# Patient Record
Sex: FEMALE | Race: BLACK OR AFRICAN AMERICAN | NOT HISPANIC OR LATINO
[De-identification: names, ages, dates, MRNs, and addresses within clinical notes are randomized per-mention and may not be internally consistent; named-entity substitution may affect disease eponyms.]

---

## 2018-07-03 PROBLEM — Z00.00 ENCOUNTER FOR PREVENTIVE HEALTH EXAMINATION: Status: ACTIVE | Noted: 2018-07-03

## 2018-07-20 ENCOUNTER — APPOINTMENT (OUTPATIENT)
Dept: GASTROENTEROLOGY | Facility: CLINIC | Age: 53
End: 2018-07-20
Payer: MEDICAID

## 2018-07-20 VITALS
TEMPERATURE: 97.1 F | OXYGEN SATURATION: 99 % | HEIGHT: 64 IN | RESPIRATION RATE: 14 BRPM | DIASTOLIC BLOOD PRESSURE: 72 MMHG | HEART RATE: 96 BPM | WEIGHT: 201 LBS | SYSTOLIC BLOOD PRESSURE: 130 MMHG | BODY MASS INDEX: 34.31 KG/M2

## 2018-07-20 DIAGNOSIS — Z87.19 PERSONAL HISTORY OF OTHER DISEASES OF THE DIGESTIVE SYSTEM: ICD-10-CM

## 2018-07-20 DIAGNOSIS — Z86.39 PERSONAL HISTORY OF OTHER ENDOCRINE, NUTRITIONAL AND METABOLIC DISEASE: ICD-10-CM

## 2018-07-20 DIAGNOSIS — K63.5 POLYP OF COLON: ICD-10-CM

## 2018-07-20 DIAGNOSIS — Z87.09 PERSONAL HISTORY OF OTHER DISEASES OF THE RESPIRATORY SYSTEM: ICD-10-CM

## 2018-07-20 PROCEDURE — 99204 OFFICE O/P NEW MOD 45 MIN: CPT

## 2018-07-20 RX ORDER — PANTOPRAZOLE 20 MG/1
20 TABLET, DELAYED RELEASE ORAL
Refills: 0 | Status: ACTIVE | COMMUNITY

## 2018-07-20 RX ORDER — PRAVASTATIN SODIUM 80 MG/1
TABLET ORAL
Refills: 0 | Status: ACTIVE | COMMUNITY

## 2018-07-20 RX ORDER — POLYETHYLENE GLYCOL 3350, SODIUM SULFATE ANHYDROUS, SODIUM BICARBONATE, SODIUM CHLORIDE, POTASSIUM CHLORIDE 227.1; 21.5; 6.36; 5.53; .754 G/L; G/L; G/L; G/L; G/L
227.1 POWDER, FOR SOLUTION ORAL
Qty: 1 | Refills: 0 | Status: ACTIVE | COMMUNITY
Start: 2018-07-20 | End: 1900-01-01

## 2018-07-20 RX ORDER — ALBUTEROL SULFATE 4 MG/1
TABLET ORAL
Refills: 0 | Status: ACTIVE | COMMUNITY

## 2018-07-20 RX ORDER — SAXAGLIPTIN AND METFORMIN HYDROCHLORIDE 2.5; 1 MG/1; MG/1
2.5-1 TABLET, FILM COATED, EXTENDED RELEASE ORAL
Refills: 0 | Status: ACTIVE | COMMUNITY

## 2018-07-20 RX ORDER — BUDESONIDE 1 MG/2ML
SUSPENSION RESPIRATORY (INHALATION)
Refills: 0 | Status: ACTIVE | COMMUNITY

## 2018-07-20 RX ORDER — ALBUTEROL SULFATE 90 UG/1
AEROSOL, METERED RESPIRATORY (INHALATION)
Refills: 0 | Status: ACTIVE | COMMUNITY

## 2018-07-20 RX ORDER — AMLODIPINE AND ATORVASTATIN 10; 20 MG/1; MG/1
10-20 TABLET, COATED ORAL
Refills: 0 | Status: ACTIVE | COMMUNITY

## 2018-07-31 ENCOUNTER — MED ADMIN CHARGE (OUTPATIENT)
Age: 53
End: 2018-07-31

## 2018-07-31 RX ORDER — POLYETHYLENE GLYCOL 3350 AND ELECTROLYTES WITH LEMON FLAVOR 236; 22.74; 6.74; 5.86; 2.97 G/4L; G/4L; G/4L; G/4L; G/4L
236 POWDER, FOR SOLUTION ORAL
Qty: 1 | Refills: 0 | Status: ACTIVE | COMMUNITY
Start: 2018-07-31 | End: 1900-01-01

## 2018-08-10 ENCOUNTER — APPOINTMENT (OUTPATIENT)
Dept: GASTROENTEROLOGY | Facility: HOSPITAL | Age: 53
End: 2018-08-10

## 2018-08-10 ENCOUNTER — OUTPATIENT (OUTPATIENT)
Dept: OUTPATIENT SERVICES | Facility: HOSPITAL | Age: 53
LOS: 1 days | End: 2018-08-10
Payer: MEDICAID

## 2018-08-10 ENCOUNTER — RESULT REVIEW (OUTPATIENT)
Age: 53
End: 2018-08-10

## 2018-08-10 DIAGNOSIS — K63.5 POLYP OF COLON: ICD-10-CM

## 2018-08-10 LAB — GLUCOSE BLDC GLUCOMTR-MCNC: 171 MG/DL — HIGH (ref 70–99)

## 2018-08-10 PROCEDURE — 45385 COLONOSCOPY W/LESION REMOVAL: CPT | Mod: PT

## 2018-08-10 PROCEDURE — 82962 GLUCOSE BLOOD TEST: CPT

## 2018-08-10 PROCEDURE — 45385 COLONOSCOPY W/LESION REMOVAL: CPT | Mod: GC

## 2018-08-10 PROCEDURE — C1889: CPT

## 2018-08-13 LAB — SURGICAL PATHOLOGY STUDY: SIGNIFICANT CHANGE UP

## 2018-08-22 ENCOUNTER — RESULT REVIEW (OUTPATIENT)
Age: 53
End: 2018-08-22

## 2018-08-22 DIAGNOSIS — D12.6 BENIGN NEOPLASM OF COLON, UNSPECIFIED: ICD-10-CM

## 2018-08-29 ENCOUNTER — RESULT CHARGE (OUTPATIENT)
Age: 53
End: 2018-08-29

## 2018-08-29 ENCOUNTER — RESULT REVIEW (OUTPATIENT)
Age: 53
End: 2018-08-29

## 2018-10-01 ENCOUNTER — OUTPATIENT (OUTPATIENT)
Dept: OUTPATIENT SERVICES | Facility: HOSPITAL | Age: 53
LOS: 1 days | End: 2018-10-01

## 2018-10-15 ENCOUNTER — EMERGENCY (EMERGENCY)
Facility: HOSPITAL | Age: 53
LOS: 0 days | Discharge: HOME | End: 2018-10-15
Attending: EMERGENCY MEDICINE
Payer: MEDICAID

## 2018-10-15 VITALS
DIASTOLIC BLOOD PRESSURE: 81 MMHG | SYSTOLIC BLOOD PRESSURE: 150 MMHG | HEART RATE: 99 BPM | TEMPERATURE: 98 F | OXYGEN SATURATION: 99 % | RESPIRATION RATE: 20 BRPM

## 2018-10-15 VITALS
WEIGHT: 205.91 LBS | TEMPERATURE: 98 F | DIASTOLIC BLOOD PRESSURE: 82 MMHG | OXYGEN SATURATION: 100 % | HEART RATE: 96 BPM | HEIGHT: 64 IN | SYSTOLIC BLOOD PRESSURE: 131 MMHG | RESPIRATION RATE: 20 BRPM
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
## 2018-10-15 DIAGNOSIS — M25.472 EFFUSION, LEFT ANKLE: ICD-10-CM

## 2018-10-15 DIAGNOSIS — S82.832A OTHER FRACTURE OF UPPER AND LOWER END OF LEFT FIBULA, INITIAL ENCOUNTER FOR CLOSED FRACTURE: ICD-10-CM
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
## 2018-10-15 DIAGNOSIS — M25.572 PAIN IN LEFT ANKLE AND JOINTS OF LEFT FOOT: ICD-10-CM

## 2018-10-15 PROCEDURE — 99284 EMERGENCY DEPT VISIT MOD MDM: CPT

## 2018-10-15 PROCEDURE — 73610 X-RAY EXAM OF ANKLE: CPT | Mod: 26,LT

## 2018-10-15 PROCEDURE — 73600 X-RAY EXAM OF ANKLE: CPT | Mod: 26,LT,76

## 2018-10-15 PROCEDURE — 73590 X-RAY EXAM OF LOWER LEG: CPT | Mod: 26,LT

## 2018-10-15 RX ORDER — DIPHENHYDRAMINE HCL 50 MG
50 CAPSULE ORAL ONCE
Qty: 0 | Refills: 0 | Status: COMPLETED | OUTPATIENT
Start: 2018-10-15 | End: 2018-10-15

## 2018-10-15 RX ORDER — IBUPROFEN 200 MG
1 TABLET ORAL
Qty: 15 | Refills: 0 | OUTPATIENT
Start: 2018-10-15 | End: 2018-10-19

## 2018-10-15 RX ORDER — IBUPROFEN 200 MG
600 TABLET ORAL ONCE
Qty: 0 | Refills: 0 | Status: COMPLETED | OUTPATIENT
Start: 2018-10-15 | End: 2018-10-15

## 2018-10-15 RX ORDER — DIPHENHYDRAMINE HCL 50 MG
1 CAPSULE ORAL
Qty: 9 | Refills: 0 | OUTPATIENT
Start: 2018-10-15 | End: 2018-10-17

## 2018-10-15 RX ORDER — OXYCODONE AND ACETAMINOPHEN 5; 325 MG/1; MG/1
1 TABLET ORAL ONCE
Qty: 0 | Refills: 0 | Status: DISCONTINUED | OUTPATIENT
Start: 2018-10-15 | End: 2018-10-15

## 2018-10-15 RX ADMIN — Medication 600 MILLIGRAM(S): at 09:45

## 2018-10-15 RX ADMIN — Medication 600 MILLIGRAM(S): at 12:24

## 2018-10-15 RX ADMIN — OXYCODONE AND ACETAMINOPHEN 1 TABLET(S): 5; 325 TABLET ORAL at 12:30

## 2018-10-15 NOTE — ED PROVIDER NOTE - PROGRESS NOTE DETAILS
Ortho is notified. Ortho are here eval and treating pt now. pt is alert and oriented x 3 denies rash, itching, sob, chest pain, nausea, vomiting, abd pain. Pt is advised to follow up with Dr. Amanda garcia.

## 2018-10-15 NOTE — ED ADULT NURSE REASSESSMENT NOTE - NS ED NURSE REASSESS COMMENT FT1
Crutches and Discharge document signed. Denies all complaints. Neuro intact, Resp even unlabored, MAEx4. Patient verbalized understanding of discharge instructions, need for followup.  Patient also provided with signs and symptoms to return to the emergency department immediately.  Patient A+Ox3, patient in no apparent distress.  Patient verbalized understanding, at this time patient has no further questions.

## 2018-10-15 NOTE — ED ADULT NURSE NOTE - NSIMPLEMENTINTERV_GEN_ALL_ED
Implemented All Fall Risk Interventions:  [place] to call system. Call bell, personal items and telephone within reach. Instruct patient to call for assistance. Room bathroom lighting operational. Non-slip footwear when patient is off stretcher. Physically safe environment: no spills, clutter or unnecessary equipment. Stretcher in lowest position, wheels locked, appropriate side rails in place. Provide visual cue, wrist band, yellow gown, etc. Monitor gait and stability. Monitor for mental status changes and reorient to person, place, and time. Review medications for side effects contributing to fall risk. Reinforce activity limits and safety measures with patient and family.

## 2018-10-15 NOTE — CONSULT NOTE ADULT - ASSESSMENT
A/P: 53F s/p MF w/ Bimalleolar Equivalent ankle fracture  Analgesia  DVT ppx  NWB LLE  PT/OT  Placed in Trilam splint  Discussed w/ pt need to return if experiencing signs and symptoms of compartment syndrome  [facility] Outpatient with Dr. Patel for preop surgical planning  Will discuss with attending and advise if plan changes

## 2018-10-15 NOTE — CONSULT NOTE ADULT - SUBJECTIVE AND OBJECTIVE BOX
Patient is a 53F community ambulator without assistive devices who presents to the ED for a c/o of L ankle pain. Patient states she sustained a mechanical fall down her stairs at home 3 days ago and has had worsening pain since. She states she has been partial weight bearing on her L ankle and has been using a cane but states it is very painful. Denies HH/LOC. Denies any numbness/tingling. Denies having any other pain elsewhere. Denies any previous orthopedic history. No other orthopedic concerns at this time.    ROS: All other systems reviewed and negative except as noted in HPI    PAST MEDICAL & SURGICAL HISTORY:  UTI (urinary tract infection)  Asthma  Hypertension  Diabetes  No significant past surgical history    Allergies    Biaxin (Hives)  Nuts (Urticaria)  shellfish (Hives)  Vicodin (Hives)  walnut (Urticaria)    Intolerances    Home Medications:  amLODIPine-benazepril:  orally  (12 Sep 2014 23:53)  losartan:  orally  (12 Sep 2014 23:53)  pantoprazole:  orally  (12 Sep 2014 23:53)  Pulmicort Flexhaler 180 mcg/inh inhalation powder: 1 puff(s) inhaled 2 times a day (12 Sep 2014 23:53)  Ventolin HFA CFC free 90 mcg/inh inhalation aerosol: 2 puff(s) inhaled 4 times a day, As Needed (12 Sep 2014 23:53)    PHYSICAL EXAM:  Vital Signs Last 24 Hrs  T(C): 36.9 (15 Oct 2018 09:08), Max: 36.9 (15 Oct 2018 09:08)  T(F): 98.5 (15 Oct 2018 09:08), Max: 98.5 (15 Oct 2018 09:08)  HR: 96 (15 Oct 2018 09:08) (96 - 96)  BP: 131/82 (15 Oct 2018 09:08) (131/82 - 131/82)  RR: 20 (15 Oct 2018 09:08) (20 - 20)  SpO2: 100% (15 Oct 2018 09:08) (100% - 100%)    Gen: NAD; Resting comfortably  LLE:   Diffuse swelling around ankle joint; No erythema or ecchymosis  +ttp diffusely around ankle   +EHL/FHL  Unable to assess ankle ROM secondary to pain  +SILT L3-S1  +DP  Compartments soft and compressible  No calf tenderness    Secondary Survey:  RLE/RUE/LUE: No TTP over bony prominences, SILT, palpable pulses, full/painless range of motion, compartments soft     XR L Ankle: Bimalleolar equivalent ankle fracture    Procedure:  After a thorough discussion with the patient and verbal consent, the patient was placed into a well padded trilam splint. Patient was NVI preprocedure and postprocedure. Patient tolerated procedure well without any complications.

## 2018-10-15 NOTE — ED PROVIDER NOTE - ENMT, MLM
Airway patent, Nasal mucosa clear. Mouth with normal mucosa. Throat has no vesicles, no oropharyngeal exudates and uvula is midline. No hematoma no wound in the scalp or face.

## 2018-10-15 NOTE — ED ADULT NURSE NOTE - OBJECTIVE STATEMENT
Pt state Imissed 2 steps and fell down the stairs 3 days ago. Pt c/o of left renata pain and swelling.

## 2018-10-15 NOTE — ED PROVIDER NOTE - OBJECTIVE STATEMENT
53 years old female walked in with her cane c/o left ankle pain swelling after she twisted it 3 days ago. Pt denies trauma to head, headache, dizziness, neck/back pain, blurred visions, light sensitivities, focal/distal weakness or numbness, cough, sob, chest pain, nausea, vomiting, fever, chills, abd pain, dysuria, vaginal spotting or discharge or irregular bowel movements.

## 2018-10-22 ENCOUNTER — EMERGENCY (EMERGENCY)
Facility: HOSPITAL | Age: 53
LOS: 0 days | Discharge: HOME | End: 2018-10-22
Attending: EMERGENCY MEDICINE
Payer: MEDICAID

## 2018-10-22 VITALS
OXYGEN SATURATION: 96 % | TEMPERATURE: 98 F | HEIGHT: 64 IN | DIASTOLIC BLOOD PRESSURE: 77 MMHG | HEART RATE: 94 BPM | RESPIRATION RATE: 17 BRPM | SYSTOLIC BLOOD PRESSURE: 131 MMHG | WEIGHT: 205.91 LBS

## 2018-10-22 VITALS
HEART RATE: 85 BPM | SYSTOLIC BLOOD PRESSURE: 121 MMHG | TEMPERATURE: 97 F | RESPIRATION RATE: 17 BRPM | OXYGEN SATURATION: 98 % | DIASTOLIC BLOOD PRESSURE: 76 MMHG

## 2018-10-22 DIAGNOSIS — Z47.89 ENCOUNTER FOR OTHER ORTHOPEDIC AFTERCARE: ICD-10-CM

## 2018-10-22 PROCEDURE — 99283 EMERGENCY DEPT VISIT LOW MDM: CPT

## 2018-10-22 PROCEDURE — 73610 X-RAY EXAM OF ANKLE: CPT | Mod: 26,LT

## 2018-10-22 NOTE — ED PROVIDER NOTE - ATTENDING CONTRIBUTION TO CARE
53yoF; with pmh signif for DM, s/p fall sustaining left distal fibular fracture 1 week ago; now p/w bruising over foot today.  patient states she has been walking more recently and more active at home without use of assistive device (walker and crutches). c/o bruising and swelling over foot. denies paresthesia. denies pain. denies cold sensation.    PMH: DM  SOCIAL: No tobacco/illicit substance use/EtOH  EXAM: General:     NAD, well-nourished, well-appearing  Head:     NC/AT, EOMI, oral mucosa moist  Neck:     trachea midline  Lungs:     CTA b/l, no w/r/r  CVS:     S1S2, RRR, no m/g/r  Abd:     +BS, s/nt/nd, no organomegaly  Ext:    L LE:  posterior splint in place.   distal toes 1-5 pink, warm, <2sec cap refill. mild ecchymoses of 3rd digit.  sensation intact.  dressing taken down by ortho and no skin breakdown, mild edema, non-tender over compartments.  Neuro: AAOx3, no sensory/motor deficits

## 2018-10-22 NOTE — CONSULT NOTE ADULT - ASSESSMENT
53F with one week old L ankle fx  Pain control  Emphasized maintaining non weight bearing LLE in trilam splint with assistive devices as needed  Xrays unchanged from one week prior  Discussed signs/symptoms of compartment syndrome and told Pt to return to ED if exhibit any  Discussed possible need for surgery in the future  FU with scheduled appointment this week with Dr. Patel as outpatient  No acute ortho intervention at this time  Ortho stable for d/c

## 2018-10-22 NOTE — ED ADULT NURSE NOTE - OBJECTIVE STATEMENT
Pt is a 53YOF who is here for complications with her cast. Pt states she has a "slight pain in her shin" and her mom is concerned that her toes are "turning blue". Pt has no numbness or tingling noted, states sensation in both feet is identical.

## 2018-10-22 NOTE — ED ADULT NURSE NOTE - NSIMPLEMENTINTERV_GEN_ALL_ED
Implemented All Universal Safety Interventions:  [place] to call system. Call bell, personal items and telephone within reach. Instruct patient to call for assistance. Room bathroom lighting operational. Non-slip footwear when patient is off stretcher. Physically safe environment: no spills, clutter or unnecessary equipment. Stretcher in lowest position, wheels locked, appropriate side rails in place.

## 2018-10-22 NOTE — ED ADULT NURSE NOTE - NS ED NOTE ABUSE SUSPICION NEGLECT YN
[facility] faxed 90 day refill request for metformin, atorvastatin, and glipizide. Last appointment: 10/31/16. He is due for appointment on or after 1/31/17. Left message. Please have him set up appointment so we can send in meds.    No

## 2018-10-22 NOTE — ED PROVIDER NOTE - EXTREMITY EXAM
LEFT ANKLE0 CAST IN PLACE, NO ERYTHEMA, NO WARMTH, GOOD PUSLE AND SENSORY/left lower extremity findings

## 2018-10-22 NOTE — ED ADULT TRIAGE NOTE - CHIEF COMPLAINT QUOTE
patient state " I had broken ankle and they cast it, now my toes are blue and I felt soreness on my leg "

## 2018-10-22 NOTE — ED PROVIDER NOTE - OBJECTIVE STATEMENT
This is a 54 yo f w  pmhx dm, htn s/p left ankle distal fibula fracture. Pt has cast in placed done by ortho. she has appt on 10/25/2018 but came in today bc pain is so severe. Denies nausea, vomting, fever, sob

## 2018-10-22 NOTE — CONSULT NOTE ADULT - SUBJECTIVE AND OBJECTIVE BOX
53F presents to [facility] ED c/o L leg pain. Pt was seen one week ago for a left ankle bimalleolar equivalent fracture and was placed in a trilam splint. Pt has a scheduled appointment with Dr. Patel on 10/25. Pt came to ED today for pain in L leg around proximal part of splint. Denies any new falls/trauma. States she has been ambulating with crutches and sometimes a walker. Admits to partial weight bearing on LLE occasionally No other complaints at this time.    CONSTITUTIONAL: No fever or chills  HEENT:  No headache, no sore throat  RESPIRATORY: No cough, wheezing, or shortness of breath  CARDIOVASCULAR: No chest pain, palpitations, or leg swelling  GASTROINTESTINAL: No nausea, vomiting, or diarrhea  GENITOURINARY: No dysuria, frequency, or hematuria  NEUROLOGICAL: no focal weakness or dizziness  SKIN:  No rashes or lesions   MUSCULOSKELETAL: no myalgias   PSYCHIATRIC: No depression or anxiety    PAST MEDICAL & SURGICAL HISTORY:  UTI (urinary tract infection)  Asthma  Hypertension  Diabetes  No significant past surgical history    Home Medications:  amLODIPine-benazepril:  orally  (12 Sep 2014 23:53)  losartan:  orally  (12 Sep 2014 23:53)  pantoprazole:  orally  (12 Sep 2014 23:53)  Pulmicort Flexhaler 180 mcg/inh inhalation powder: 1 puff(s) inhaled 2 times a day (12 Sep 2014 23:53)  Ventolin HFA CFC free 90 mcg/inh inhalation aerosol: 2 puff(s) inhaled 4 times a day, As Needed (12 Sep 2014 23:53)    Allergies    Biaxin (Hives)  Nuts (Urticaria)  shellfish (Hives)  Vicodin (Hives)  walnut (Urticaria)    Intolerances    Vital Signs Last 24 Hrs  T(C): 36.6 (22 Oct 2018 20:07), Max: 36.6 (22 Oct 2018 20:07)  T(F): 97.8 (22 Oct 2018 20:07), Max: 97.8 (22 Oct 2018 20:07)  HR: 94 (22 Oct 2018 20:07) (94 - 94)  BP: 131/77 (22 Oct 2018 20:07) (131/77 - 131/77)  BP(mean): --  RR: 17 (22 Oct 2018 20:07) (17 - 17)  SpO2: 96% (22 Oct 2018 20:07) (96% - 96%)    Imaging: XR L ankle: Re-demonstration of L rob equivalent ankle fracture, unchanged from one week prior    Physical:   Gen: NAD, AAOx3  LLE: Splint loosened and taken down to examine skin, +swelling over anterior foot/ankle, +ecchymosis over toes/anterior foot, unable to wrinkle skin anteriorly over ankle, +EHL/FHL/TA/GS, SILT throughout foot and all 5 toes, +dp pulse intact, cap refill brisk, compartments soft/compressible, no pain with passive stretch of toes

## 2018-10-24 DIAGNOSIS — Z71.89 OTHER SPECIFIED COUNSELING: ICD-10-CM

## 2018-10-25 ENCOUNTER — APPOINTMENT (OUTPATIENT)
Dept: ORTHOPEDIC SURGERY | Facility: CLINIC | Age: 53
End: 2018-10-25
Payer: MEDICAID

## 2018-10-25 VITALS — WEIGHT: 206 LBS | BODY MASS INDEX: 35.17 KG/M2 | HEIGHT: 64 IN

## 2018-10-25 PROCEDURE — 99203 OFFICE O/P NEW LOW 30 MIN: CPT

## 2018-10-26 ENCOUNTER — OUTPATIENT (OUTPATIENT)
Dept: OUTPATIENT SERVICES | Facility: HOSPITAL | Age: 53
LOS: 1 days | Discharge: HOME | End: 2018-10-26
Payer: MEDICAID

## 2018-10-26 VITALS
DIASTOLIC BLOOD PRESSURE: 75 MMHG | TEMPERATURE: 98 F | HEART RATE: 87 BPM | HEIGHT: 64 IN | OXYGEN SATURATION: 99 % | RESPIRATION RATE: 18 BRPM | SYSTOLIC BLOOD PRESSURE: 121 MMHG

## 2018-10-26 DIAGNOSIS — J45.909 UNSPECIFIED ASTHMA, UNCOMPLICATED: ICD-10-CM

## 2018-10-26 DIAGNOSIS — Z01.818 ENCOUNTER FOR OTHER PREPROCEDURAL EXAMINATION: ICD-10-CM

## 2018-10-26 DIAGNOSIS — N97.1 FEMALE INFERTILITY OF TUBAL ORIGIN: Chronic | ICD-10-CM

## 2018-10-26 DIAGNOSIS — S82.842A DISPLACED BIMALLEOLAR FRACTURE OF LEFT LOWER LEG, INITIAL ENCOUNTER FOR CLOSED FRACTURE: ICD-10-CM

## 2018-10-26 DIAGNOSIS — E11.9 TYPE 2 DIABETES MELLITUS WITHOUT COMPLICATIONS: ICD-10-CM

## 2018-10-26 DIAGNOSIS — I10 ESSENTIAL (PRIMARY) HYPERTENSION: ICD-10-CM

## 2018-10-26 LAB
ANION GAP SERPL CALC-SCNC: 10 MMOL/L — SIGNIFICANT CHANGE UP (ref 5–17)
BASOPHILS # BLD AUTO: 0.05 K/UL — SIGNIFICANT CHANGE UP (ref 0–0.2)
BASOPHILS NFR BLD AUTO: 0.5 % — SIGNIFICANT CHANGE UP (ref 0–2)
BUN SERPL-MCNC: 8 MG/DL — SIGNIFICANT CHANGE UP (ref 7–23)
CALCIUM SERPL-MCNC: 8.7 MG/DL — SIGNIFICANT CHANGE UP (ref 8.5–10.1)
CHLORIDE SERPL-SCNC: 107 MMOL/L — SIGNIFICANT CHANGE UP (ref 96–108)
CO2 SERPL-SCNC: 27 MMOL/L — SIGNIFICANT CHANGE UP (ref 22–31)
CREAT SERPL-MCNC: 0.55 MG/DL — SIGNIFICANT CHANGE UP (ref 0.5–1.3)
EOSINOPHIL # BLD AUTO: 0.17 K/UL — SIGNIFICANT CHANGE UP (ref 0–0.5)
EOSINOPHIL NFR BLD AUTO: 1.8 % — SIGNIFICANT CHANGE UP (ref 0–6)
GLUCOSE SERPL-MCNC: 173 MG/DL — HIGH (ref 70–99)
HCG UR QL: NEGATIVE — SIGNIFICANT CHANGE UP
HCT VFR BLD CALC: 40 % — SIGNIFICANT CHANGE UP (ref 34.5–45)
HGB BLD-MCNC: 13.2 G/DL — SIGNIFICANT CHANGE UP (ref 11.5–15.5)
IMM GRANULOCYTES NFR BLD AUTO: 0.4 % — SIGNIFICANT CHANGE UP (ref 0–1.5)
LYMPHOCYTES # BLD AUTO: 2.83 K/UL — SIGNIFICANT CHANGE UP (ref 1–3.3)
LYMPHOCYTES # BLD AUTO: 30.7 % — SIGNIFICANT CHANGE UP (ref 13–44)
MCHC RBC-ENTMCNC: 28.6 PG — SIGNIFICANT CHANGE UP (ref 27–34)
MCHC RBC-ENTMCNC: 33 GM/DL — SIGNIFICANT CHANGE UP (ref 32–36)
MCV RBC AUTO: 86.6 FL — SIGNIFICANT CHANGE UP (ref 80–100)
MONOCYTES # BLD AUTO: 0.6 K/UL — SIGNIFICANT CHANGE UP (ref 0–0.9)
MONOCYTES NFR BLD AUTO: 6.5 % — SIGNIFICANT CHANGE UP (ref 2–14)
NEUTROPHILS # BLD AUTO: 5.53 K/UL — SIGNIFICANT CHANGE UP (ref 1.8–7.4)
NEUTROPHILS NFR BLD AUTO: 60.1 % — SIGNIFICANT CHANGE UP (ref 43–77)
NRBC # BLD: 0 /100 WBCS — SIGNIFICANT CHANGE UP (ref 0–0)
PLATELET # BLD AUTO: 415 K/UL — HIGH (ref 150–400)
POTASSIUM SERPL-MCNC: 3.7 MMOL/L — SIGNIFICANT CHANGE UP (ref 3.5–5.3)
POTASSIUM SERPL-SCNC: 3.7 MMOL/L — SIGNIFICANT CHANGE UP (ref 3.5–5.3)
RBC # BLD: 4.62 M/UL — SIGNIFICANT CHANGE UP (ref 3.8–5.2)
RBC # FLD: 14.1 % — SIGNIFICANT CHANGE UP (ref 10.3–14.5)
SODIUM SERPL-SCNC: 144 MMOL/L — SIGNIFICANT CHANGE UP (ref 135–145)
WBC # BLD: 9.22 K/UL — SIGNIFICANT CHANGE UP (ref 3.8–10.5)
WBC # FLD AUTO: 9.22 K/UL — SIGNIFICANT CHANGE UP (ref 3.8–10.5)

## 2018-10-26 PROCEDURE — 93010 ELECTROCARDIOGRAM REPORT: CPT | Mod: NC

## 2018-10-26 NOTE — H&P PST ADULT - HISTORY OF PRESENT ILLNESS
53F pmh htn, asthma (never intubated), hl, DM reports trip and fall down stairs at home found to have bimalleolar fracture here for PST for scheduled open reduction internal fixation of left ankle

## 2018-10-26 NOTE — ASU PATIENT PROFILE, ADULT - PMH
Arthritis    Asthma    Diabetes    GERD (gastroesophageal reflux disease)    Hypertension    UTI (urinary tract infection)

## 2018-10-26 NOTE — H&P PST ADULT - ATTENDING COMMENTS
Unstable L ankle fracture indicated for ORIF. All RBAs discussed. All questions answered. Informed consent obtained

## 2018-10-26 NOTE — H&P PST ADULT - ASSESSMENT
53F pmh htn, asthma (never intubated), hl, DM reports trip and fall down stairs at home found to have bimalleolar fracture here for PST for scheduled open reduction internal fixation of left ankle  CAPRINI SCORE    AGE RELATED RISK FACTORS                                                       MOBILITY RELATED FACTORS  [x ] Age 41-60 years                                            (1 Point)                  [ ] Bed rest                                                        (1 Point)  [ ] Age: 61-74 years                                           (2 Points)                [x ] Plaster cast                                                   (2 Points)  [ ] Age= 75 years                                              (3 Points)                 [ ] Bed bound for more than 72 hours                   (2 Points)    DISEASE RELATED RISK FACTORS                                               GENDER SPECIFIC FACTORS  [ ] Edema in the lower extremities                       (1 Point)                  [ ] Pregnancy                                                     (1 Point)  [ ] Varicose veins                                               (1 Point)                  [ ] Post-partum < 6 weeks                                   (1 Point)             [x ] BMI > 25 Kg/m2                                            (1 Point)                  [ ] Hormonal therapy  or oral contraception            (1 Point)                 [ ] Sepsis (in the previous month)                        (1 Point)                  [ ] History of pregnancy complications  [ ] Pneumonia or serious lung disease                                               [ ] Unexplained or recurrent                       (1 Point)           (in the previous month)                               (1 Point)  [ ] Abnormal pulmonary function test                     (1 Point)                 SURGERY RELATED RISK FACTORS  [ ] Acute myocardial infarction                              (1 Point)                 [ ]  Section                                            (1 Point)  [ ] Congestive heart failure (in the previous month)  (1 Point)                 [ ] Minor surgery                                                 (1 Point)   [ ] Inflammatory bowel disease                             (1 Point)                 [ ] Arthroscopic surgery                                        (2 Points)  [ ] Central venous access                                    (2 Points)                [x ] General surgery lasting more than 45 minutes   (2 Points)       [ ] Stroke (in the previous month)                          (5 Points)               [ ] Elective arthroplasty                                        (5 Points)                                                                                                                                               HEMATOLOGY RELATED FACTORS                                                 TRAUMA RELATED RISK FACTORS  [ ] Prior episodes of VTE                                     (3 Points)                 [ ] Fracture of the hip, pelvis, or leg                       (5 Points)  [ ] Positive family history for VTE                         (3 Points)                 [ ] Acute spinal cord injury (in the previous month)  (5 Points)  [ ] Prothrombin 84879 A                                      (3 Points)                 [ ] Paralysis  (less than 1 month)                          (5 Points)  [ ] Factor V Leiden                                             (3 Points)                 [ ] Multiple Trauma within 1 month                         (5 Points)  [ ] Lupus anticoagulants                                     (3 Points)                                                           [ ] Anticardiolipin antibodies                                (3 Points)                                                       [ ] High homocysteine in the blood                      (3 Points)                                             [ ] Other congenital or acquired thrombophilia       (3 Points)                                                [ ] Heparin induced thrombocytopenia                  (3 Points)                                          Total Score [    6      ]

## 2018-10-27 ENCOUNTER — EMERGENCY (EMERGENCY)
Facility: HOSPITAL | Age: 53
LOS: 0 days | Discharge: HOME | End: 2018-10-27
Attending: EMERGENCY MEDICINE
Payer: MEDICAID

## 2018-10-27 VITALS
RESPIRATION RATE: 16 BRPM | OXYGEN SATURATION: 98 % | SYSTOLIC BLOOD PRESSURE: 136 MMHG | DIASTOLIC BLOOD PRESSURE: 81 MMHG | HEART RATE: 94 BPM | TEMPERATURE: 98 F

## 2018-10-27 VITALS
TEMPERATURE: 98 F | HEIGHT: 64 IN | DIASTOLIC BLOOD PRESSURE: 76 MMHG | RESPIRATION RATE: 18 BRPM | SYSTOLIC BLOOD PRESSURE: 147 MMHG | OXYGEN SATURATION: 100 % | WEIGHT: 205.91 LBS | HEART RATE: 103 BPM
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
## 2018-10-27 DIAGNOSIS — N97.1 FEMALE INFERTILITY OF TUBAL ORIGIN: Chronic | ICD-10-CM

## 2018-10-27 DIAGNOSIS — Z00.00 ENCOUNTER FOR GENERAL ADULT MEDICAL EXAMINATION WITHOUT ABNORMAL FINDINGS: ICD-10-CM

## 2018-10-27 DIAGNOSIS — S82.832A OTHER FRACTURE OF UPPER AND LOWER END OF LEFT FIBULA, INITIAL ENCOUNTER FOR CLOSED FRACTURE: ICD-10-CM

## 2018-10-27 DIAGNOSIS — E11.9 TYPE 2 DIABETES MELLITUS WITHOUT COMPLICATIONS: ICD-10-CM
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
## 2018-10-27 DIAGNOSIS — Z91.013 ALLERGY TO SEAFOOD: ICD-10-CM

## 2018-10-27 DIAGNOSIS — I10 ESSENTIAL (PRIMARY) HYPERTENSION: ICD-10-CM

## 2018-10-27 PROBLEM — M19.90 UNSPECIFIED OSTEOARTHRITIS, UNSPECIFIED SITE: Chronic | Status: ACTIVE | Noted: 2018-10-26

## 2018-10-27 PROBLEM — K21.9 GASTRO-ESOPHAGEAL REFLUX DISEASE WITHOUT ESOPHAGITIS: Chronic | Status: ACTIVE | Noted: 2018-10-26

## 2018-10-27 PROCEDURE — 29515 APPLICATION SHORT LEG SPLINT: CPT

## 2018-10-27 PROCEDURE — 99282 EMERGENCY DEPT VISIT SF MDM: CPT | Mod: 25

## 2018-10-27 NOTE — ED ADULT NURSE NOTE - NSIMPLEMENTINTERV_GEN_ALL_ED
Implemented All Fall Risk Interventions:  [place] to call system. Call bell, personal items and telephone within reach. Instruct patient to call for assistance. Room bathroom lighting operational. Non-slip footwear when patient is off stretcher. Physically safe environment: no spills, clutter or unnecessary equipment. Stretcher in lowest position, wheels locked, appropriate side rails in place. Provide visual cue, wrist band, yellow gown, etc. Monitor gait and stability. Monitor for mental status changes and reorient to person, place, and time. Review medications for side effects contributing to fall risk. Reinforce activity limits and safety measures with patient and family.

## 2018-10-27 NOTE — ED PROVIDER NOTE - OBJECTIVE STATEMENT
53 year old female with PMH of GERD HTN, UTI, asthma presenting to ED due to left ankle splint getting wet - states got in shower and got area wet. Denies any other pain or symptoms to ankle.

## 2018-10-27 NOTE — ED PROVIDER NOTE - MUSCULOSKELETAL MINIMAL EXAM
ankle pain, swelling to renata cap refill < 2 seconds, splint is wet on posterior and inferior aspect.

## 2018-10-27 NOTE — ED PROCEDURE NOTE - NS ED PERI VASCULAR NEG
no swelling/fingers/toes warm to touch/no cyanosis of extremity/no paresthesia/capillary refill time < 2 seconds

## 2018-10-29 ENCOUNTER — FORM ENCOUNTER (OUTPATIENT)
Age: 53
End: 2018-10-29

## 2018-10-30 ENCOUNTER — OUTPATIENT (OUTPATIENT)
Dept: OUTPATIENT SERVICES | Facility: HOSPITAL | Age: 53
LOS: 1 days | Discharge: HOME | End: 2018-10-30
Payer: MEDICAID

## 2018-10-30 ENCOUNTER — APPOINTMENT (OUTPATIENT)
Dept: ORTHOPEDIC SURGERY | Facility: HOSPITAL | Age: 53
End: 2018-10-30

## 2018-10-30 VITALS
OXYGEN SATURATION: 97 % | TEMPERATURE: 97 F | DIASTOLIC BLOOD PRESSURE: 70 MMHG | WEIGHT: 205.91 LBS | RESPIRATION RATE: 16 BRPM | HEART RATE: 96 BPM | SYSTOLIC BLOOD PRESSURE: 123 MMHG | HEIGHT: 64 IN

## 2018-10-30 VITALS
RESPIRATION RATE: 15 BRPM | SYSTOLIC BLOOD PRESSURE: 134 MMHG | OXYGEN SATURATION: 97 % | DIASTOLIC BLOOD PRESSURE: 74 MMHG | HEART RATE: 91 BPM

## 2018-10-30 DIAGNOSIS — N97.1 FEMALE INFERTILITY OF TUBAL ORIGIN: Chronic | ICD-10-CM

## 2018-10-30 LAB — GLUCOSE BLDC GLUCOMTR-MCNC: 190 MG/DL — HIGH (ref 70–99)

## 2018-10-30 PROCEDURE — 27792 TREATMENT OF ANKLE FRACTURE: CPT | Mod: LT

## 2018-10-30 RX ORDER — TRAMADOL HYDROCHLORIDE 50 MG/1
1 TABLET ORAL
Qty: 40 | Refills: 0 | OUTPATIENT
Start: 2018-10-30 | End: 2018-11-08

## 2018-10-30 RX ORDER — ASPIRIN/CALCIUM CARB/MAGNESIUM 324 MG
1 TABLET ORAL
Qty: 14 | Refills: 0 | OUTPATIENT
Start: 2018-10-30 | End: 2018-11-12

## 2018-10-30 RX ORDER — IBUPROFEN 200 MG
1 TABLET ORAL
Qty: 0 | Refills: 0 | COMMUNITY

## 2018-10-30 RX ORDER — ACETAMINOPHEN 500 MG
1000 TABLET ORAL ONCE
Qty: 0 | Refills: 0 | Status: COMPLETED | OUTPATIENT
Start: 2018-10-30 | End: 2018-10-30

## 2018-10-30 RX ORDER — ONDANSETRON 8 MG/1
4 TABLET, FILM COATED ORAL ONCE
Qty: 0 | Refills: 0 | Status: DISCONTINUED | OUTPATIENT
Start: 2018-10-30 | End: 2018-10-30

## 2018-10-30 RX ORDER — HYDROMORPHONE HYDROCHLORIDE 2 MG/ML
0.5 INJECTION INTRAMUSCULAR; INTRAVENOUS; SUBCUTANEOUS
Qty: 0 | Refills: 0 | Status: DISCONTINUED | OUTPATIENT
Start: 2018-10-30 | End: 2018-10-30

## 2018-10-30 RX ORDER — SODIUM CHLORIDE 9 MG/ML
1000 INJECTION, SOLUTION INTRAVENOUS
Qty: 0 | Refills: 0 | Status: DISCONTINUED | OUTPATIENT
Start: 2018-10-30 | End: 2018-10-30

## 2018-10-30 RX ORDER — FENTANYL CITRATE 50 UG/ML
25 INJECTION INTRAVENOUS
Qty: 0 | Refills: 0 | Status: DISCONTINUED | OUTPATIENT
Start: 2018-10-30 | End: 2018-10-30

## 2018-10-30 RX ADMIN — FENTANYL CITRATE 25 MICROGRAM(S): 50 INJECTION INTRAVENOUS at 13:28

## 2018-10-30 RX ADMIN — Medication 1000 MILLIGRAM(S): at 13:43

## 2018-10-30 RX ADMIN — Medication 400 MILLIGRAM(S): at 13:13

## 2018-10-30 RX ADMIN — SODIUM CHLORIDE 100 MILLILITER(S): 9 INJECTION, SOLUTION INTRAVENOUS at 13:21

## 2018-10-30 RX ADMIN — FENTANYL CITRATE 25 MICROGRAM(S): 50 INJECTION INTRAVENOUS at 13:13

## 2018-10-30 NOTE — ASU DISCHARGE PLAN (ADULT/PEDIATRIC). - NOTIFY
Fever greater than 101/Numbness, tingling/Numbness, color, or temperature change to extremity/Bleeding that does not stop/Pain not relieved by Medications

## 2018-10-30 NOTE — BRIEF OPERATIVE NOTE - OPERATION/FINDINGS
Open reduction internal fixation of left ankle fracture with lateral mal lag screw and neutralization plate

## 2018-10-30 NOTE — ASU DISCHARGE PLAN (ADULT/PEDIATRIC). - ACTIVITY LEVEL
elevate extremity/no weight bearing/Non weight bearing left lower extremity/no sports/gym/no heavy lifting/no exercise

## 2018-10-30 NOTE — ASU PATIENT PROFILE, ADULT - PMH
Arthritis    Asthma    Diabetes    GERD (gastroesophageal reflux disease)    Hypertension    UTI (urinary tract infection) Arthritis    Asthma    Diabetes    GERD (gastroesophageal reflux disease)    Hypertension

## 2018-10-30 NOTE — ASU DISCHARGE PLAN (ADULT/PEDIATRIC). - MEDICATION SUMMARY - MEDICATIONS TO TAKE
I will START or STAY ON the medications listed below when I get home from the hospital:    Pulmicort Flexhaler 180 mcg/inh inhalation powder  -- 1 puff(s) inhaled 2 times a day  -- Indication: For prior home med    aspirin 325 mg oral tablet  -- 1 tab(s) by mouth once a day for DVT prophylaxis  -- Take with food or milk.    -- Indication: For blood clot prevention    traMADol 50 mg oral tablet  -- 1 tab(s) by mouth every 6 hours, As Needed for pain MDD:4   -- Caution federal law prohibits the transfer of this drug to any person other  than the person for whom it was prescribed.  May cause drowsiness.  Alcohol may intensify this effect.  Use care when operating dangerous machinery.  Obtain medical advice before taking any non-prescription drugs as some may affect the action of this medication.    -- Indication: For pain    amLODIPine-benazepril  --  by mouth   -- Indication: For prior home med    Ventolin HFA CFC free 90 mcg/inh inhalation aerosol  -- 2 puff(s) inhaled 4 times a day, As Needed  -- Indication: For prior home med    pantoprazole  --  by mouth   -- Indication: For prior home med

## 2018-10-30 NOTE — BRIEF OPERATIVE NOTE - PROCEDURE
<<-----Click on this checkbox to enter Procedure Open reduction and internal fixation (ORIF) of ankle  10/30/2018    Active  [id number]

## 2018-10-30 NOTE — ASU DISCHARGE PLAN (ADULT/PEDIATRIC). - SPECIAL INSTRUCTIONS
Non weight bearing left lower extremity in trilam splint  Keep LLE elevated and ice periodically for first few days post op

## 2018-11-01 DIAGNOSIS — K21.9 GASTRO-ESOPHAGEAL REFLUX DISEASE WITHOUT ESOPHAGITIS: ICD-10-CM
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
## 2018-11-01 DIAGNOSIS — Z88.5 ALLERGY STATUS TO NARCOTIC AGENT: ICD-10-CM

## 2018-11-01 DIAGNOSIS — Z79.1 LONG TERM (CURRENT) USE OF NON-STEROIDAL ANTI-INFLAMMATORIES (NSAID): ICD-10-CM

## 2018-11-01 DIAGNOSIS — M19.90 UNSPECIFIED OSTEOARTHRITIS, UNSPECIFIED SITE: ICD-10-CM

## 2018-11-01 DIAGNOSIS — E66.9 OBESITY, UNSPECIFIED: ICD-10-CM

## 2018-11-01 DIAGNOSIS — S82.62XA DISPLACED FRACTURE OF LATERAL MALLEOLUS OF LEFT FIBULA, INITIAL ENCOUNTER FOR CLOSED FRACTURE: ICD-10-CM

## 2018-11-01 DIAGNOSIS — J45.909 UNSPECIFIED ASTHMA, UNCOMPLICATED: ICD-10-CM
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
## 2018-11-01 DIAGNOSIS — E11.9 TYPE 2 DIABETES MELLITUS WITHOUT COMPLICATIONS: ICD-10-CM

## 2018-11-01 DIAGNOSIS — Z91.013 ALLERGY TO SEAFOOD: ICD-10-CM

## 2018-11-01 DIAGNOSIS — I10 ESSENTIAL (PRIMARY) HYPERTENSION: ICD-10-CM

## 2018-11-19 ENCOUNTER — APPOINTMENT (OUTPATIENT)
Dept: ORTHOPEDIC SURGERY | Facility: CLINIC | Age: 53
End: 2018-11-19
Payer: MEDICAID

## 2018-11-19 DIAGNOSIS — Z78.9 OTHER SPECIFIED HEALTH STATUS: ICD-10-CM

## 2018-11-19 DIAGNOSIS — Z87.39 PERSONAL HISTORY OF OTHER DISEASES OF THE MUSCULOSKELETAL SYSTEM AND CONNECTIVE TISSUE: ICD-10-CM

## 2018-11-19 PROCEDURE — 99024 POSTOP FOLLOW-UP VISIT: CPT

## 2018-11-19 PROCEDURE — 73610 X-RAY EXAM OF ANKLE: CPT | Mod: LT

## 2018-12-17 ENCOUNTER — APPOINTMENT (OUTPATIENT)
Dept: ORTHOPEDIC SURGERY | Facility: CLINIC | Age: 53
End: 2018-12-17
Payer: MEDICAID

## 2018-12-17 PROCEDURE — 73610 X-RAY EXAM OF ANKLE: CPT | Mod: LT

## 2018-12-17 PROCEDURE — 99024 POSTOP FOLLOW-UP VISIT: CPT

## 2019-02-04 ENCOUNTER — APPOINTMENT (OUTPATIENT)
Dept: ORTHOPEDIC SURGERY | Facility: CLINIC | Age: 54
End: 2019-02-04
Payer: MEDICAID

## 2019-02-04 PROCEDURE — 73610 X-RAY EXAM OF ANKLE: CPT | Mod: LT

## 2019-02-04 PROCEDURE — 99214 OFFICE O/P EST MOD 30 MIN: CPT

## 2019-02-04 NOTE — HISTORY OF PRESENT ILLNESS
[de-identified] : S/P ORIF Lateral malleolus ankle fracture 10/30/18 [de-identified] : 52 yo F S/P ORIF Lateral malleolus ankle fracture 10/30/18 doing well post operatively. presents for routine post op. wound has fully healed  [de-identified] : NAD\par incision CDI fully healed, no erythema noted, no active drainage, no expressible drainage\par SILT TN/SPN/DPN/SN \par 5/5 TA PT GS EHL FHL peroneal \par 2+ distal pulses.  [de-identified] : 3 views left ankle taken today and reviewed by me show well fixed left lateral malleolar ankle fracture with hardware in good position no signs of mechanical failure.  [de-identified] : S/P ORIF Lateral malleolus ankle fracture 10/30/18 doing well. Postoperatively, the patient is doing well, has excellent pain control and is showing no signs of infection.  [de-identified] : -WBAT \par - Continue PT \par - F/U in 3 months

## 2019-05-06 ENCOUNTER — APPOINTMENT (OUTPATIENT)
Dept: ORTHOPEDIC SURGERY | Facility: CLINIC | Age: 54
End: 2019-05-06

## 2019-05-22 ENCOUNTER — APPOINTMENT (OUTPATIENT)
Dept: ORTHOPEDIC SURGERY | Facility: CLINIC | Age: 54
End: 2019-05-22
Payer: MEDICAID

## 2019-05-22 DIAGNOSIS — S82.842D DISPLACED BIMALLEOLAR FRACTURE OF LEFT LOWER LEG, SUBSEQUENT ENCOUNTER FOR CLOSED FRACTURE WITH ROUTINE HEALING: ICD-10-CM

## 2019-05-22 PROCEDURE — 73610 X-RAY EXAM OF ANKLE: CPT | Mod: LT

## 2019-05-22 PROCEDURE — 99213 OFFICE O/P EST LOW 20 MIN: CPT

## 2019-05-22 NOTE — HISTORY OF PRESENT ILLNESS
[de-identified] : 55 yo F S/P ORIF Lateral malleolus ankle fracture 10/30/18 by Dr. Patel, doing well post operatively. Complains of moderate swelling and minor pain in L ankle after prolonged standing at work. Denies NT. Doing well overall. Patient completed physical therapy.

## 2019-05-22 NOTE — PHYSICAL EXAM
[de-identified] : General Exam\par \par Well developed, well nourished\par No apparent distress\par Oriented to person, place, and time\par Mood: Normal\par Affect: Normal\par Balance and coordination: Normal\par Gait: Normal\par \par left ankle exam\par \par Skin: Clean, dry, intact\par Inspection: No obvious malalignment, no swelling, no effusion\par Tenderness: No tenderness over the lateral malleolus, medial malleolus, CFL/ATFL/PTFL, deltoid ligament. No tenderness proximal fibula. No tenderness about heel, no pain with heel squeeze.\par ROM:dorsi flexor tendon degrees plantar flexion 40° normal subtalar motion. \par Stability: Negative anterior/posterior drawer.\par Additional tests: Negative Mortons compression test, Negative syndesmosis squeeze test.\par Strength: 5/5 TA/GS/EHL\par Sensation: Intact to light touch in dp/sp/tib/ayaan/saph distributions\par Pulses: 2+ DP/PT pulses\par  [de-identified] : \par The following radiographs were ordered and read by me during this patients visit. I reviewed each radiograph in detail with the patient and discussed the findings as highlighted below. \par \par 3 views of the left ankle were obtained today. Healed left ankle fracture while positioned hardware.\par

## 2019-05-22 NOTE — REASON FOR VISIT
[Initial Visit] : an initial visit for [FreeTextEntry2] : S/P ORIF Lateral malleolus ankle fracture 10/30/18.

## 2019-05-22 NOTE — DISCUSSION/SUMMARY
[de-identified] : \par 6 months status post open reduction internal fixation left ankle fracture which is healed. Weightbearing as tolerated activities as tolerated followup as needed. All questions answered

## 2019-08-20 NOTE — ASU PREOP CHECKLIST - AS BP NONINV SITE
Patient is here with multitude of issues her biggest complaints or headache balance issues she refuses physical therapy right leg weakness when she walks she's tripping on a rug 2 daughters are peppering me with problems and questions including some other old  here her biggest issue is low-protein reviewed her labs she's dizzy when she stands up but not just with change in position it's not a true spinning she just gets lightheaded fall she's not using a walker at home she is using a cane she needs to use a walker she needs to get a physical therapist and she refuses she's having headache pain behind her eyes from her neck    Past medical history reviewed social history allergies and meds reviewed labs reviewed    Physical exam blood pressure is 120/82 with pulse irregular in the 70s she sitting sideways leading to her left in the wheelchair I told her to straighten up 3 times on examining her and she cannot sit straight she has increased thoracic kyphosis E's increased muscle spasm in her trapezius muscles with trigger points bilateral upper trap and right lower trap heart irregular rhythm and rate lungs clear extremities with 1 to plus edema she sits in her chair again having a hard time standing straight    Procedure sterile prep with alcohol half a cc 1% Xylocaine with epinephrine is injected into 2-3 different trigger points in her upper traps 1 left one right one lower right trap tolerates procedure well    Assessment: Myofascial pain plus depression essential tremor chronic back pain plan at this point is interscapular and leg strengthening exercises she refuses therapy despite her best effort see how the trigger points go continue meds the same follow-up here in her next appointment in November I monitored doing quad sets for strengthening interscapular stretching and strengthening exercises she was instructed on those by myself right upper arm

## 2021-06-15 NOTE — ASU DISCHARGE PLAN (ADULT/PEDIATRIC). - ASU FOLLOWUP
Quality 226: Preventive Care And Screening: Tobacco Use: Screening And Cessation Intervention: Patient screened for tobacco use and is an ex/non-smoker 911 or go to the nearest Emergency Room Detail Level: Detailed Quality 130: Documentation Of Current Medications In The Medical Record: Current Medications Documented

## 2022-04-05 ENCOUNTER — EMERGENCY (EMERGENCY)
Facility: HOSPITAL | Age: 57
LOS: 0 days | Discharge: HOME | End: 2022-04-06
Attending: EMERGENCY MEDICINE
Payer: MEDICAID

## 2022-04-05 VITALS
DIASTOLIC BLOOD PRESSURE: 89 MMHG | HEART RATE: 86 BPM | TEMPERATURE: 98 F | OXYGEN SATURATION: 96 % | WEIGHT: 192.02 LBS | HEIGHT: 64 IN | RESPIRATION RATE: 20 BRPM | SYSTOLIC BLOOD PRESSURE: 158 MMHG

## 2022-04-05 DIAGNOSIS — E78.5 HYPERLIPIDEMIA, UNSPECIFIED: ICD-10-CM

## 2022-04-05 DIAGNOSIS — Z88.1 ALLERGY STATUS TO OTHER ANTIBIOTIC AGENTS STATUS: ICD-10-CM

## 2022-04-05 DIAGNOSIS — I10 ESSENTIAL (PRIMARY) HYPERTENSION: ICD-10-CM

## 2022-04-05 DIAGNOSIS — K75.81 NONALCOHOLIC STEATOHEPATITIS (NASH): ICD-10-CM

## 2022-04-05 DIAGNOSIS — N97.1 FEMALE INFERTILITY OF TUBAL ORIGIN: Chronic | ICD-10-CM

## 2022-04-05 DIAGNOSIS — R10.811 RIGHT UPPER QUADRANT ABDOMINAL TENDERNESS: ICD-10-CM

## 2022-04-05 DIAGNOSIS — Z91.013 ALLERGY TO SEAFOOD: ICD-10-CM

## 2022-04-05 DIAGNOSIS — R10.11 RIGHT UPPER QUADRANT PAIN: ICD-10-CM

## 2022-04-05 DIAGNOSIS — E11.9 TYPE 2 DIABETES MELLITUS WITHOUT COMPLICATIONS: ICD-10-CM

## 2022-04-05 DIAGNOSIS — N94.9 UNSPECIFIED CONDITION ASSOCIATED WITH FEMALE GENITAL ORGANS AND MENSTRUAL CYCLE: ICD-10-CM

## 2022-04-05 DIAGNOSIS — Z91.018 ALLERGY TO OTHER FOODS: ICD-10-CM

## 2022-04-05 DIAGNOSIS — J45.909 UNSPECIFIED ASTHMA, UNCOMPLICATED: ICD-10-CM

## 2022-04-05 LAB
ALBUMIN SERPL ELPH-MCNC: 3.9 G/DL — SIGNIFICANT CHANGE UP (ref 3.3–5)
ALP SERPL-CCNC: 90 U/L — SIGNIFICANT CHANGE UP (ref 40–120)
ALT FLD-CCNC: 34 U/L — SIGNIFICANT CHANGE UP (ref 12–78)
ANION GAP SERPL CALC-SCNC: 7 MMOL/L — SIGNIFICANT CHANGE UP (ref 5–17)
APPEARANCE UR: CLEAR — SIGNIFICANT CHANGE UP
AST SERPL-CCNC: 17 U/L — SIGNIFICANT CHANGE UP (ref 15–37)
BASOPHILS # BLD AUTO: 0.05 K/UL — SIGNIFICANT CHANGE UP (ref 0–0.2)
BASOPHILS NFR BLD AUTO: 0.4 % — SIGNIFICANT CHANGE UP (ref 0–2)
BILIRUB SERPL-MCNC: 0.4 MG/DL — SIGNIFICANT CHANGE UP (ref 0.2–1.2)
BILIRUB UR-MCNC: NEGATIVE — SIGNIFICANT CHANGE UP
BUN SERPL-MCNC: 11 MG/DL — SIGNIFICANT CHANGE UP (ref 7–23)
CALCIUM SERPL-MCNC: 10.1 MG/DL — SIGNIFICANT CHANGE UP (ref 8.5–10.1)
CHLORIDE SERPL-SCNC: 105 MMOL/L — SIGNIFICANT CHANGE UP (ref 96–108)
CO2 SERPL-SCNC: 27 MMOL/L — SIGNIFICANT CHANGE UP (ref 22–31)
COLOR SPEC: YELLOW — SIGNIFICANT CHANGE UP
CREAT SERPL-MCNC: 0.61 MG/DL — SIGNIFICANT CHANGE UP (ref 0.5–1.3)
DIFF PNL FLD: NEGATIVE — SIGNIFICANT CHANGE UP
EGFR: 104 ML/MIN/1.73M2 — SIGNIFICANT CHANGE UP
EOSINOPHIL # BLD AUTO: 0.29 K/UL — SIGNIFICANT CHANGE UP (ref 0–0.5)
EOSINOPHIL NFR BLD AUTO: 2.6 % — SIGNIFICANT CHANGE UP (ref 0–6)
GLUCOSE SERPL-MCNC: 173 MG/DL — HIGH (ref 70–99)
GLUCOSE UR QL: 1000 MG/DL
HCG SERPL-ACNC: 1 MIU/ML — SIGNIFICANT CHANGE UP
HCT VFR BLD CALC: 43.2 % — SIGNIFICANT CHANGE UP (ref 34.5–45)
HGB BLD-MCNC: 14.7 G/DL — SIGNIFICANT CHANGE UP (ref 11.5–15.5)
IMM GRANULOCYTES NFR BLD AUTO: 0.3 % — SIGNIFICANT CHANGE UP (ref 0–1.5)
KETONES UR-MCNC: NEGATIVE — SIGNIFICANT CHANGE UP
LACTATE SERPL-SCNC: 1.2 MMOL/L — SIGNIFICANT CHANGE UP (ref 0.7–2)
LEUKOCYTE ESTERASE UR-ACNC: NEGATIVE — SIGNIFICANT CHANGE UP
LIDOCAIN IGE QN: 102 U/L — SIGNIFICANT CHANGE UP (ref 73–393)
LYMPHOCYTES # BLD AUTO: 3.98 K/UL — HIGH (ref 1–3.3)
LYMPHOCYTES # BLD AUTO: 35.7 % — SIGNIFICANT CHANGE UP (ref 13–44)
MCHC RBC-ENTMCNC: 30.1 PG — SIGNIFICANT CHANGE UP (ref 27–34)
MCHC RBC-ENTMCNC: 34 G/DL — SIGNIFICANT CHANGE UP (ref 32–36)
MCV RBC AUTO: 88.3 FL — SIGNIFICANT CHANGE UP (ref 80–100)
MONOCYTES # BLD AUTO: 0.83 K/UL — SIGNIFICANT CHANGE UP (ref 0–0.9)
MONOCYTES NFR BLD AUTO: 7.4 % — SIGNIFICANT CHANGE UP (ref 2–14)
NEUTROPHILS # BLD AUTO: 5.98 K/UL — SIGNIFICANT CHANGE UP (ref 1.8–7.4)
NEUTROPHILS NFR BLD AUTO: 53.6 % — SIGNIFICANT CHANGE UP (ref 43–77)
NITRITE UR-MCNC: NEGATIVE — SIGNIFICANT CHANGE UP
NRBC # BLD: 0 /100 WBCS — SIGNIFICANT CHANGE UP (ref 0–0)
PH UR: 6 — SIGNIFICANT CHANGE UP (ref 5–8)
PLATELET # BLD AUTO: 312 K/UL — SIGNIFICANT CHANGE UP (ref 150–400)
POTASSIUM SERPL-MCNC: 3.6 MMOL/L — SIGNIFICANT CHANGE UP (ref 3.5–5.3)
POTASSIUM SERPL-SCNC: 3.6 MMOL/L — SIGNIFICANT CHANGE UP (ref 3.5–5.3)
PROT SERPL-MCNC: 7.9 GM/DL — SIGNIFICANT CHANGE UP (ref 6–8.3)
PROT UR-MCNC: NEGATIVE MG/DL — SIGNIFICANT CHANGE UP
RBC # BLD: 4.89 M/UL — SIGNIFICANT CHANGE UP (ref 3.8–5.2)
RBC # FLD: 13.4 % — SIGNIFICANT CHANGE UP (ref 10.3–14.5)
SODIUM SERPL-SCNC: 139 MMOL/L — SIGNIFICANT CHANGE UP (ref 135–145)
SP GR SPEC: 1.01 — SIGNIFICANT CHANGE UP (ref 1.01–1.02)
TROPONIN I, HIGH SENSITIVITY RESULT: 12.2 NG/L — SIGNIFICANT CHANGE UP
UROBILINOGEN FLD QL: NEGATIVE MG/DL — SIGNIFICANT CHANGE UP
WBC # BLD: 11.16 K/UL — HIGH (ref 3.8–10.5)
WBC # FLD AUTO: 11.16 K/UL — HIGH (ref 3.8–10.5)

## 2022-04-05 PROCEDURE — 71045 X-RAY EXAM CHEST 1 VIEW: CPT | Mod: 26

## 2022-04-05 PROCEDURE — 93010 ELECTROCARDIOGRAM REPORT: CPT

## 2022-04-05 PROCEDURE — 99285 EMERGENCY DEPT VISIT HI MDM: CPT

## 2022-04-05 PROCEDURE — 76705 ECHO EXAM OF ABDOMEN: CPT | Mod: 26

## 2022-04-05 RX ORDER — IOHEXOL 300 MG/ML
30 INJECTION, SOLUTION INTRAVENOUS ONCE
Refills: 0 | Status: COMPLETED | OUTPATIENT
Start: 2022-04-05 | End: 2022-04-05

## 2022-04-05 RX ADMIN — IOHEXOL 30 MILLILITER(S): 300 INJECTION, SOLUTION INTRAVENOUS at 22:58

## 2022-04-05 NOTE — ED PROVIDER NOTE - PHYSICAL EXAMINATION
Vitals: HTN at 157/91, otherwise WNL  Gen: AAOx3, NAD, sitting uncomfortably in stretcher, non-toxic   Head: ncat, perrla, eomi b/l  Neck: supple, no lymphadenopathy, no midline deviation  Heart: rrr, no m/r/g  Lungs: CTA b/l, no rales/ronchi/wheezes  Abd: soft, tender in RUQ, with R CVA tenderness, non-distended, no rebound or guarding  Ext: no clubbing/cyanosis/edema  Neuro: sensation and muscle strength intact b/l, steady gait

## 2022-04-05 NOTE — ED PROVIDER NOTE - CARE PROVIDER_API CALL
[place] [address]  Phone: [phone]  Fax: [phone]  Follow Up Time: 4-6 Days    Misael Parnell  Obstetrics and Gynecology  [address] [place] [id number]  Phone: [phone]  Fax: [phone]  Follow Up Time: 4-6 Days

## 2022-04-05 NOTE — ED PROVIDER NOTE - CARE PLAN
1 Principal Discharge DX:	RUQ abdominal pain   Principal Discharge DX:	RUQ abdominal pain  Secondary Diagnosis:	Steatohepatitis  Secondary Diagnosis:	Adnexal cyst

## 2022-04-05 NOTE — ED ADULT TRIAGE NOTE - WEIGHT IN KG
Met with family regarding discharge needs. Family training with OT and speech. Pt expressed her wishes to return home and daughter in agreement. Daughter requested home health services with [facility]; referral sent. 87.1

## 2022-04-05 NOTE — ED PROVIDER NOTE - OBJECTIVE STATEMENT
56 yo F with RUQ abd pain for 3 weeks, worse today.  PT. states she has been feeling RUQ abd pain radiating to back and R flank.  NO prior w/u.  Pt. thinks her stomach is swollen on R compared to L as well, sensation of fullness present.  No other complaints/trauma/inciting event.   ROS: negative for fever, cough, headache, chest pain, shortness of breath, nausea, vomiting, diarrhea, rash, paresthesia, and focal weakness--all other systems reviewed are negative.   PMH: HTN, NIDDM, HLD, asthma; Meds: pravastatin, albuterol, iron, albuterol prn, pulmicort, diltiazem, glipizide; SH: Denies smoking/drinking/drug use

## 2022-04-05 NOTE — ED PROVIDER NOTE - PROGRESS NOTE DETAILS
Results reported to patient--severe steatohepatitis noted on CT, possibly explaining pain, R adnexal lesions incidental, labs grossly unremarkable   Pt. reports feeling better after meds  pt. agrees to f/u with primary care outpt., referred to ob/gyn and GI for urgent f/u given findings   pt. understands to return to ED if symptoms worsen; will d/c with ibuprofen prn

## 2022-04-05 NOTE — ED PROVIDER NOTE - PATIENT PORTAL LINK FT
You can access the FollowMyHealth Patient Portal offered by [facility] by registering at the following website: [email or web address]. By joining [facility]’s FollowMyHealth portal, you will also be able to view your health information using other applications (apps) compatible with our system.

## 2022-04-05 NOTE — ED PROVIDER NOTE - PROVIDER TOKENS
PROVIDER:[TOKEN:[[id number]],FOLLOWUP:[4-6 Days]],PROVIDER:[TOKEN:[[id number]],FOLLOWUP:[4-6 Days]]

## 2022-04-05 NOTE — ED ADULT NURSE NOTE - OBJECTIVE STATEMENT
pt alert and oriented x3. pt c/o right ABD and flank pain for the past two months. pt states she came in today because she noticed the left side of her ABD appeared swollen. pt reports frequency with urination. pt denies N/V, chest pain, SOB, burning on urination. pt alert and oriented x3. pt c/o right ABD and flank pain for the past two months. pt states she came in today because she noticed the right side of her ABD appeared swollen. pt reports frequency with urination. pt denies N/V, chest pain, SOB, burning on urination.

## 2022-04-05 NOTE — ED ADULT NURSE REASSESSMENT NOTE - NS ED NURSE REASSESS COMMENT FT1
pt re assessed, vital signs taken pt still complain of abdominal pain 6/10. still waiting to be seen inside pt re assured.

## 2022-04-06 VITALS
DIASTOLIC BLOOD PRESSURE: 92 MMHG | HEART RATE: 87 BPM | OXYGEN SATURATION: 97 % | SYSTOLIC BLOOD PRESSURE: 167 MMHG | RESPIRATION RATE: 16 BRPM | TEMPERATURE: 98 F

## 2022-04-06 LAB
RAPID RVP RESULT: SIGNIFICANT CHANGE UP
SARS-COV-2 RNA SPEC QL NAA+PROBE: SIGNIFICANT CHANGE UP

## 2022-04-06 PROCEDURE — 74177 CT ABD & PELVIS W/CONTRAST: CPT | Mod: 26,MA

## 2022-04-06 RX ORDER — IBUPROFEN 200 MG
1 TABLET ORAL
Qty: 20 | Refills: 0
Start: 2022-04-06 | End: 2022-04-10

## 2022-04-08 LAB
CULTURE RESULTS: SIGNIFICANT CHANGE UP
SPECIMEN SOURCE: SIGNIFICANT CHANGE UP

## 2022-06-24 ENCOUNTER — APPOINTMENT (OUTPATIENT)
Dept: OBGYN | Facility: CLINIC | Age: 57
End: 2022-06-24

## 2022-06-24 VITALS
SYSTOLIC BLOOD PRESSURE: 148 MMHG | HEART RATE: 84 BPM | WEIGHT: 197 LBS | BODY MASS INDEX: 33.63 KG/M2 | HEIGHT: 64 IN | DIASTOLIC BLOOD PRESSURE: 86 MMHG

## 2022-06-24 DIAGNOSIS — N76.0 ACUTE VAGINITIS: ICD-10-CM

## 2022-06-24 PROCEDURE — 99202 OFFICE O/P NEW SF 15 MIN: CPT

## 2022-06-24 NOTE — DISCUSSION/SUMMARY
[FreeTextEntry1] : A/P Pt with no symptoms but with ?4/8 cm adnexal lesion seen on CT scan but not seen on subsequent pelvic sono.  Pt in stable condition.\par \par Discussed sonogram findings and pt does not wish to do MRI or other studies at this time.\par \par Will:\par \par 1) Exp management\par 2) Come back if any pain, bleeding, temps, N/V\par 3) Sonogram next year\par \par GEOFFREY Parnell

## 2022-06-24 NOTE — HISTORY OF PRESENT ILLNESS
[FreeTextEntry1] : 56 y/o LMP 2/2020 went to [place] [facility] due to the fact that she saw in the mirror that her abdomen was 'lopsided'.  Pt with no complaints of pelvic pain or other GI/ issues.\par Pt had CT scan and ab sono that showed fatty liver and 4.8 cm adnexal right lesion that they could not tell if it was a myoma or something else. \par \par Pt had sono recently at [facility] that showed 6 x 2 x 6 cm uterus with no myomas and ovaries could not be visulaized - no other lesions noted.  \par \par \par PMH - Dyslipidemia; HTN; ASthma; DM\par PSH - not sig\par \par Intake form reviewed\par \par 6/2021 PAP - WNL as per pt\par 3/2022 Mammogram - WNL as per pt

## 2022-06-24 NOTE — PHYSICAL EXAM
[Chaperone Present] : A chaperone was present in the examining room during all aspects of the physical examination [Appropriately responsive] : appropriately responsive [Alert] : alert [No Acute Distress] : no acute distress [No Lymphadenopathy] : no lymphadenopathy [Regular Rate Rhythm] : regular rate rhythm [No Murmurs] : no murmurs [Clear to Auscultation B/L] : clear to auscultation bilaterally [Soft] : soft [Non-tender] : non-tender [Non-distended] : non-distended [No HSM] : No HSM [No Lesions] : no lesions [No Mass] : no mass [Oriented x3] : oriented x3 [Vulvar Atrophy] : vulvar atrophy [Labia Majora] : normal [Labia Minora] : normal [Atrophy] : atrophy [Normal] : normal [FreeTextEntry6] : 6-8 weeks/AV/mobile/NT/no masses

## 2022-06-25 LAB
CANDIDA VAG CYTO: NOT DETECTED
G VAGINALIS+PREV SP MTYP VAG QL MICRO: DETECTED
T VAGINALIS VAG QL WET PREP: NOT DETECTED

## 2023-01-01 NOTE — H&P PST ADULT - PSYCHIATRIC
negative negative Affect and characteristics of appearance, verbalizations, behaviors are appropriate

## 2023-03-13 NOTE — ED ADULT NURSE NOTE - CARDIO ASSESSMENT
--- Dapsone Pregnancy And Lactation Text: This medication is Pregnancy Category C and is not considered safe during pregnancy or breast feeding.

## 2023-08-01 NOTE — ED ADULT NURSE NOTE - ALCOHOL PRE SCREEN (AUDIT - C)
[facility]: Cancer Care                                                                                          Pt called to report sxs of a raw throat with the gland on the right side tender to the touch. Below right ear toward back of the throat, no fever, covid test yesterday-- negative. Soreness progressed as yesterday went on yesterday, no worse today but still is where it was at worst yesterday.   Pt has not had any infections over the course of her treatment. Has not been able to look at throat. Drinking all the time, appetite same, energy is same, fatigued a lot of the time. Message sent to Dr Horvath.   Per Dr Horvath: pt should follow up with her primary care physician, she probably needs infection testing (strep, respiratory viral panel etc). Pt verbalized understanding and will call to see someone at her primary clinic  Pt called back and LVM to report she is going to go to  due to not being able to get into her primary clinic for several weeks.   Signature:  Sadie Roberto RN  
Statement Selected

## 2024-01-23 NOTE — ED PROVIDER NOTE - NS ED NOTE AC HIGH RISK COUNTRIES
Patient: Cristiano Silva    Procedure Summary       Date: 01/23/24 Room / Location: [facility]    Anesthesia Start: 1348 Anesthesia Stop: 1414    Procedure: COLONOSCOPY Diagnosis:       Colon cancer screening      Colon cancer screening    Scheduled Providers: Dex Winters MD Responsible Provider: Jer Bose DO    Anesthesia Type: general ASA Status: 3            Anesthesia Type: general    Vitals Value Taken Time   /61 01/23/24 1414   Temp 36 01/23/24 1414   Pulse 93 01/23/24 1414   Resp 15 01/23/24 1414   SpO2 98 01/23/24 1414       Anesthesia Post Evaluation    Patient location during evaluation: PACU  Patient participation: complete - patient participated  Level of consciousness: awake and alert  Pain management: satisfactory to patient  Airway patency: patent  Cardiovascular status: acceptable  Respiratory status: acceptable  Hydration status: acceptable  Postoperative Nausea and Vomiting: none        There were no known notable events for this encounter.    
No

## 2024-06-06 NOTE — ED PROVIDER NOTE - MEDICAL DECISION MAKING DETAILS
Last OV: 4/2/2024  Next OV: Visit date not found    Next appointment due: not stated     Last fill:12/27/23  Refills:1   xray with cast in place, exam normal, seen by ortho, recommended patient to use assistive device, elevate leg more frequently, f/up with ortho.

## 2025-05-13 NOTE — H&P PST ADULT - CLICK TO LAUNCH ORM
ED Attending Physician Note      Patient : Josey Atkins Age: 48 year old Sex: female   MRN: [id number] Encounter Date: 5/12/2025      Chief Complaint   Patient presents with    Head Pain    Generalized Body Aches       History     History provided by patient and/or surrogate historian: sister Bebeto at bedside    Patient is a 49 yo F who presents with fevers, body aches, dyspnea, nausea. She states she took zofran at 5pm and tylenol at 7:30. She states she had surgery follow up this morning and felt \"fantastic\" but symptoms started suddenly this afternoon around 4pm. She took her temp and it was 102.5. She states she first felt hot and achy everywhere. She felt nauseated and shaky (rigors). She states the tylenol did not bring the temperature down. She describes chest pains (mid sternum) and painful breathing. Denies leg pain or swelling. She states this started later in the evening. Denies cough, diarrhea, dysuria. She has had frequent urination. She denies abdominal pains. She describes a \"tension headache\". She states she has hx of chronic headaches.     She had her gallbladder out on 4/30. She states she has had some incisional pain but has been doing really well since the surgery. She has not been taking any pain meds. She has hx psoriatic arthritis. She was on methotrexate once weekly but has been off for the past month. She was also on remicade every 6 weeks and last dose was April 7th.         Past Medical History:   Diagnosis Date    Allergic rhinitis     Depression     Generalized anxiety disorder     Psoriasis     Psoriatic arthritis  (CMD) 07/2019    Thyroid disease        Past Surgical History:   Procedure Laterality Date    BREAST REDUCTION SURGERY Bilateral 1996    ECTOPIC PREGNANCY SURGERY      One one each tube    HAND SURGERY  10/2018    Suspicious lesion removed    LAPAROSCOPY, CHOLECYSTECTOMY      CHOLECYSTECTOMY, ROBOT-ASSISTED    THYROIDECTOMY  2000    Complete       Family History   Problem  Relation Age of Onset    Hyperlipidemia Mother     Hypertension Father     Hyperlipidemia Father     Pacemaker Sister        Social History     Tobacco Use    Smoking status: Never    Smokeless tobacco: Never   Vaping Use    Vaping status: never used   Substance Use Topics    Alcohol use: Never    Drug use: Never       Social determinants of health impacting care:    Physical Exam     ED Triage Vitals [05/12/25 2243]   ED Triage Vitals Group      Temp (!) 102.1 °F (38.9 °C)      Heart Rate (!) 131      Resp 18      /76      SpO2 98 %      EtCO2 mmHg       Height 5' 2\" (1.575 m)      Weight 185 lb (83.9 kg)      Weight Scale Used ED Stated      BMI (Calculated) 33.84      IBW/kg (Calculated) 50.1       Visit Vitals  /87 (BP Location: RUE - Right upper extremity, Patient Position: Semi-Kim's)   Pulse (!) 118   Temp (!) 100.6 °F (38.1 °C) (Oral)   Resp 20   Ht 5' 2\" (1.575 m)   Wt 83.9 kg (185 lb)   SpO2 94%   BMI 33.84 kg/m²        Pulse Oximetry Interpretation: 98% on room air which is normal for this patient.  Per my independent interpretation  Blanca Nunes MD    Physical Exam  Constitutional:       General: She is not in acute distress.     Appearance: She is not ill-appearing.   HENT:      Head: Normocephalic and atraumatic.      Nose: No congestion.      Neck: Normal range of motion and neck supple. No rigidity.   Eyes:      General:         Right eye: No discharge.         Left eye: No discharge.      Extraocular Movements: Extraocular movements intact.      Conjunctiva/sclera: Conjunctivae normal.   Cardiovascular:      Rate and Rhythm: Regular rhythm. Tachycardia present.   Pulmonary:      Effort: Pulmonary effort is normal. No respiratory distress.      Breath sounds: Normal breath sounds. No rhonchi.   Abdominal:      General: There is no distension.      Palpations: Abdomen is soft.      Tenderness: There is abdominal tenderness. There is no guarding.   Musculoskeletal:          General: No swelling or deformity.   Skin:     General: Skin is warm and dry.      Findings: No lesion or rash.   Neurological:      General: No focal deficit present.      Mental Status: She is oriented to person, place, and time.      Motor: No weakness.      Coordination: Coordination normal.           ED Course     Procedures      Lab Results     Laboratory results and imaging was reviewed and independently interpreted by me:    Results for orders placed or performed during the hospital encounter of 05/12/25   Comprehensive Metabolic Panel    Specimen: Blood, Venous   Result Value Ref Range    Fasting Status      Sodium 139 135 - 145 mmol/L    Potassium 3.6 3.4 - 5.1 mmol/L    Chloride 102 97 - 110 mmol/L    Carbon Dioxide 29 21 - 32 mmol/L    Anion Gap 12 7 - 19 mmol/L    Glucose 123 (H) 70 - 99 mg/dL    BUN 15 6 - 20 mg/dL    Creatinine 0.93 0.51 - 0.95 mg/dL    Glomerular Filtration Rate 75 >=60    BUN/Cr 16 7 - 25    Calcium 8.8 8.4 - 10.2 mg/dL    Bilirubin, Total 0.4 0.2 - 1.0 mg/dL    GOT/AST 23 <=37 Units/L    GPT/ALT 42 <64 Units/L    Alkaline Phosphatase 79 45 - 117 Units/L    Albumin 3.9 3.4 - 5.0 g/dL    Protein, Total 7.2 6.4 - 8.2 g/dL    Globulin 3.3 2.0 - 4.0 g/dL    A/G Ratio 1.2 1.0 - 2.4   Procalcitonin    Specimen: Blood, Venous   Result Value Ref Range    Procalcitonin 0.11 (H) <0.10 ng/mL   Lactic Acid Venous With Reflex    Specimen: Blood, Venous   Result Value Ref Range    Lactate, Venous 1.5 0.0 - 2.0 mmol/L   Urinalysis & Reflex Microscopy With Culture If Indicated    Specimen: Urine, Voided   Result Value Ref Range    COLOR, URINALYSIS Colorless     APPEARANCE, URINALYSIS Clear     GLUCOSE, URINALYSIS Negative Negative mg/dL    BILIRUBIN, URINALYSIS Negative Negative    KETONES, URINALYSIS Negative Negative mg/dL    SPECIFIC GRAVITY, URINALYSIS <1.005 (L) 1.005 - 1.030    OCCULT BLOOD, URINALYSIS Negative Negative    PH, URINALYSIS 6.5 5.0 - 7.0    PROTEIN, URINALYSIS Negative Negative  mg/dL    UROBILINOGEN, URINALYSIS 0.2 0.2, 1.0 mg/dL    NITRITE, URINALYSIS Negative Negative    LEUKOCYTE ESTERASE, URINALYSIS Negative Negative   CBC with Automated Differential (performable only)    Specimen: Blood, Venous   Result Value Ref Range    WBC 11.4 (H) 4.2 - 11.0 K/mcL    RBC 4.08 4.00 - 5.20 mil/mcL    HGB 11.8 (L) 12.0 - 15.5 g/dL    HCT 35.1 (L) 36.0 - 46.5 %    MCV 86.0 78.0 - 100.0 fl    MCH 28.9 26.0 - 34.0 pg    MCHC 33.6 32.0 - 36.5 g/dL    RDW-CV 13.2 11.0 - 15.0 %    RDW-SD 41.2 39.0 - 50.0 fL     140 - 450 K/mcL    NRBC 0 <=0 /100 WBC    Neutrophil, Percent 87 %    Lymphocytes, Percent 6 %    Mono, Percent 6 %    Eosinophils, Percent 1 %    Basophils, Percent 0 %    Immature Granulocytes 0 %    Absolute Neutrophils 9.8 (H) 1.8 - 7.7 K/mcL    Absolute Lymphocytes 0.7 (L) 1.0 - 4.8 K/mcL    Absolute Monocytes 0.7 0.3 - 0.9 K/mcL    Absolute Eosinophils  0.2 0.0 - 0.5 K/mcL    Absolute Basophils 0.1 0.0 - 0.3 K/mcL    Absolute Immature Granulocytes 0.0 0.0 - 0.2 K/mcL   COVID/Flu/RSV panel    Specimen: Nasal, Mid-turbinate; Swab   Result Value Ref Range    Rapid SARS-COV-2 by PCR Not Detected Not Detected    Influenza A by PCR Not Detected Not Detected    Influenza B by PCR Not Detected Not Detected    RSV BY PCR Not Detected Not Detected    Isolation Guidelines      Procedural Comment     TROPONIN I, HIGH SENSITIVITY    Specimen: Blood, Venous   Result Value Ref Range    Troponin I, High Sensitivity 25 <52 ng/L   D Dimer, Quantitative    Specimen: Blood, Venous   Result Value Ref Range    D Dimer, Quantitative 1.00 (H) <0.57 mg/L (FEU)   Blood Culture    Specimen: Blood peripheral - IV Start   Result Value Ref Range    Culture, Blood or Bone Marrow No Growth <24 hours    Blood Culture    Specimen: Blood peripheral - IV Start   Result Value Ref Range    Culture, Blood or Bone Marrow No Growth <24 hours        EKG Results     Cardiac Monitor Interpretation  Medical indication: To monitor  for arrhythmia that may develop during the ED course of stay.   Cardiac monitor: Normal sinus rhythm, no ST changes, no ectopy.  Per my Independent interpretation.  Blanca Nunes MD    EKG Interpretation  Medical indication: chest pain  12 lead EKG, as interpreted by me, shows normal sinus rhythm, no acute ischemic ST or T wave changes, normal intervals. Comparison to prior EKG: Unchanged.   Per my Independent interpretation.  Blanca Nunes MD    Radiology Results     Imaging Results              CTA CHEST PE AND CT ABD PEL W CONTRAST (Final result)  Result time 05/13/25 04:22:33   Procedure changed from CTA CHEST PULMONARY EMBOLISM     Final result                   Impression:        1. No evidence for pulm embolism.  2.Subpleural spiculated nodule measuring 3 x 2 cm in the medial left lower  lobe highly suspicious for malignancy. Recommend follow-up pulmonary  consultation and PET scan.          PROCEDURE:  CT OF THE ABDOMEN AND PELVIS WITH I.V. CONTRAST,  CTA CHEST PE  AND CT ABD PEL W CONTRAST    COMPARISON:  No relevant prior studies available for comparison.    INDICATION: Abdominal pain    TECHNIQUE:  After obtaining the patient's consent, high speed spiral  acquisition was performed through the abdomen and pelvis during the dynamic  infusion of non-ionic intravenous contrast material and CT images were  reconstructed.   Automatic exposure control and BMI adjusted mA and kVp  were utilized in addition to other dose reduction techniques    FINDINGS:       LIVER: Hepatomegaly measures 20 cm with extensive fatty filtration.  BILIARY:  No visible dilatation or calcification. Gallbladder surgically  absent.  SPLEEN: Stable splenomegaly measuring 15 cm.  PANCREAS:  No lesion, fluid collection, ductal dilatation, or atrophy  ADRENALS:  No mass or enlargement.      KIDNEYS     and URETERS: Unchanged chronic moderate right hydronephrosis with abrupt  transition point in the UPJ consistent with  underlying stenosis.        AORTA/VASCULAR:  No aneurysm or dissection.    RETROPERITONEUM:  No mass or adenopathy. No hematoma.   BOWEL:  No visible mass, dilation, or bowel wall thickening. No  appendicitis or diverticulitis.    MESENTERY     and PERITONEUM:  No masses, adenopathy, free air or free fluid.          ABDOMINAL WALL:  No mass or hernia.  PELVIC ORGANS:  No visible mass.  Pelvic organs appropriate for patient  age.    BLADDER:  No wall thickening, filling defects, masses or calcifications.  BONES:  No bony lesion or fracture.    OTHER:  Negative.      IMPRESSION:      1. Postoperative changes recent cholecystectomy with minimal residual  stranding. No focal fluid collection.  2. Additional chronic findings described.      MIPS Measures Recommendations:   -Where applicable, Fleischner criteria and/or consensus guidelines (Robbins  et al. Radiology 2017) are utilized or considered for follow-up  recommendations for pulmonary nodules.    -Where applicable current guidelines recommend that in the absence of  mitigating clinical and historical circumstances or worrisome imaging  features no follow up is necessary for incidental simple/benign appearing  (Bosniac I or II) renal cysts <=1.0cm or Adrenal lesions either <=1.0cm or   >1.0cm but <=4.0cm with likely benign appearance on, NCCT, CT washout  study, or  MRI in and out of phase imaging.                        MIPS Measures Recommendations:  -Current guidelines recommend that in the absence of mitigating clinical  and historical circumstances or     worrisome imaging features, No follow up  is necessary for incidental  <=1.0cm thyroid nodule.     Where applicable, Fleischner criteria and/or consensus guidelines (Robbins  et al. Radiology 2017)     are utilized  for follow-up recommendations for pulmonary nodules.    -Where applicable current guidelines recommend that in the absence of  mitigating clinical and historical circumstances or worrisome  imaging  features no follow up is necessary for incidental simple/benign appearing  (Bosniac I or II) renal cysts <=1.0cm or Adrenal lesions either <=1.0cm or   >1.0cm but <=4.0cm with likely benign appearance on, NCCT, CT washout  study, or  MRI in and out of phase imaging.    Electronically Signed by: AUDIE YOUSSEF MD   Signed on: 5/13/2025 4:22 AM   Workstation ID: [id number]               Narrative:    PROCEDURE:  CTA CHEST WITH I.V. CONTRAST FOR PE,  CTA CHEST PE AND CT ABD  PEL W CONTRAST    COMPARISON:  None available    INDICATION: Chest pain    TECHNIQUE:  After obtaining the patient's consent, high speed spiral  acquisition was performed through the chest during the dynamic infusion of  non-ionic intravenous contrast material and CT images were reconstructed.  Automatic exposure control and BMI adjusted mA and kVp were utilized in  addition to other dose reduction techniques.  Multi-planar/3-D imaging to  optimize visualization of vascular anatomy was performed.      FINDINGS:     PULM VASC:  No visible pulmonary arterial thrombus or attenuation.  AORTA and BRCHS:  No aneurysm or dissection.    LUNGS:   Subpleural spiculated nodule measuring 3 x 2 cm in the medial left  lower lobe highly suspicious for malignancy. Recommend follow-up pulmonary  consultation and PET scan.  PLEURA:  No effusion, mass, calcifications or pneumothorax.   CARDIAC:  No enlargement, pericardial thickening, or significant  calcification.  No significant coronary artery calcifications are noted.  MEDIASTINUM and KARI:  No mass or adenopathy.  CHEST WALL:   No mass or axillary adenopathy.  BONES:  No bony lesion or fracture.  LIMITED ABDOMEN:  Limited images of the upper abdomen are unremarkable.      OTHER:   Negative.                                         XR CHEST AP OR PA (Final result)  Result time 05/13/25 04:33:15      Final result                   Impression:      No radiographic evidence for acute cardiopulmonary  abnormality.        Electronically Signed by: AUDIE YOUSSEF MD   Signed on: 5/13/2025 4:33 AM   Workstation ID: [id number]               Narrative:    PROCEDURE:  CHEST XRAY,  XR CHEST AP OR PA    COMPARISON:  None available    INDICATIONS: Chest pain    FINDINGS:       LUNGS:  No significant pulmonary parenchymal abnormalities.    PULM. VASC.:  Unremarkable pulmonary vasculature.   CARDIAC:  No cardiac silhouette abnormality or cardiomegaly.      KARI and                     MEDIASTINUM:  No visible mass or adenopathy. No pneumomediastinum. Trachea  is midline.    PLEURA:  No effusion or pleural thickening. No Pneumothorax.   BONES:  No fracture or visible bony lesion.  OTHER:  No free air beneath the diaphragm.                                      ED Medication Orders (From admission, onward)      Ordered Start     Status Ordering Provider    05/13/25 0510 05/13/25 0515  ketorolac (TORADOL) injection 15 mg  ONCE         Last MAR action: Given MENJIVAR JEFF ALCANTAR R    05/13/25 0344 05/13/25 0345  morphine injection 4 mg  ONCE         Last MAR action: Given MENJIVAR JEFF ALCANTAR R    05/13/25 0258 05/13/25 0300  ondansetron (ZOFRAN) injection 4 mg  ONCE         Last MAR action: Given MENJIVAR ALCANTARPARADISEA R    05/12/25 2315 05/12/25 2330  sodium chloride (NORMAL SALINE) 0.9 % bolus 1,000 mL  ONCE         Last MAR action: New Bag PARADISE GRIGSBYA R    05/12/25 2315 05/12/25 2330  ketorolac (TORADOL) injection 15 mg  ONCE         Last MAR action: Given MENJIVAR PARADISE ALCANTARA R    05/12/25 2308 05/12/25 2308  ONDANSETRON HCL 4 MG/2ML IJ SOLN Pyxis Override        Note to Pharmacy: Nicolle Denny: sarwatt override    Last MAR action: Given                  Plan of care:[default value]    Clinical Impression     ED Diagnosis   1. Fever of unknown origin            Josey Atkins is a 48 year old female who presented with fever of unknown origin. Patient has of psoriatic arthritis on methotrexate (although  this has been held for the past month) and Remicade.  Patient arrives febrile to 1-2.1 tachycardic in the 130s.  Blood pressure has been stable in the ED.  Patient complains of diffuse body aches and pains.  He is having chest pain shortness of breath headache back pain.  She is status post cholecystectomy on April 30.  Lactic normal.  Culture sent.  Dimer did result elevated at 1.0.  Was sent for CT pulmonary angiogram abdomen pelvis which resulted negative for acute process to explain the patient's presentation.  Was found to have a spiculated 3 cm nodule concerning for malignancy.  I discussed with the patient this finding she was provided with a copy of the CT report and stressed the importance of outpatient follow-up with for this.  Patient continued with severe body aches and pains.  Does have a headache but this is in conjunction with diffuse body aches and pains.  Neck is supple no stiffness.  She is awake and alert mentating appropriately do not suspect acute CNS infection such as meningitis. She required 2 doses of ketorolac morphine 2 doses of ondansetron for persistent nausea.  She was given IV fluids.  Does not feel much better does not feel comfortable going home in her current state.  Specially given her immunocompromise state unclear etiology to the fevers will bring in observation for continued monitoring.        Multiple differential diagnoses were considered in the care of this patient including: Viral process including COVID and/or influenza, urinary tract infection, pneumonia, bacteremia, cellulitis or other skin or soft tissue infection     Co-morbidities/chronic illnesses potentially impacting care in this patient include but not limited to: Psoriatic arthritis on immunosuppressants     External records independently reviewed by me: Discharge summary from recent hospitalization, op note from April 30    Disposition        I carefully considered the disposition of this patient, including  admission to the hospital vs discharge from the ED.       Admit 5/13/2025  6:10 AM  Telemetry Bed?: Yes  Admitting Physician: MARIANA HOOVER [[id number]]  Is this a telephone or verbal order?: This is a telephone order from the admitting physician      Blanca Nunes MD   5/13/2025 11:09 PM           Blanca Avery MD  05/13/25 0625       Blanca Avery MD  05/13/25 0626     .

## 2025-07-17 NOTE — ED PROVIDER NOTE - CPE EDP [facility] NORM
Subjective:   Balbina Randall is a 51 y.o. female who presents for Follow-Up      Date of Injury: 3/6/2025   Industrial Injury: Yes , Comments: Walking in [place] to drop off specimen in OR lab room.  I slipped and fell  onto L knee.  I was then able to see the water that was on the floor  Previous Injuries/Diseases/Surgeries Contributing to the Condition:      3/27/2025: She was seen in urgent care x 3.  X-rays were negative.  She was advised NSAIDs and work restrictions.  She states initially her workplace was not really following restrictions and she was getting increased pain.  She states the last week and a half or so they have been following her restrictions which has helped.  She states that the knee pain is mostly anterior and lateral.  Pain is more of a burning sensation.  She is sensitive to touch in the area but does not feel a lot of pain with walking, squatting or standing unless it is done for prolonged period of time.  Denies significant prior left knee injuries     4/10/2025: Patient states that overall symptoms are overall improved.  She states that it will walk better.  However she still has sensitivity to touch over the lateral side of the patella.  She states the area is very tender to touch at times.  She sometimes has a hard time applying even lotion because the sensitivity.  She states this area gets occasionally sore if she is standing or walking a lot but for the most part does okay walking.  She feels like she would be able to do her normal job.  Denies any clicking, popping or instability.     5/8/2025: Patient continues to note anterior left knee pain.  Knee pain extends to the lateral side 2.  She states in regards to walking and squatting pain is minimal.  She states is still very tender to touch.  Patient states that she is able to work full duty with only mild discomfort.  Uses topical medications occasionally.     6/5/2025: Patient states that overall symptoms are about the same.   Continues to have lateral sided pain extending from the lateral patella.  She states she is able to walk and squat with minimal to no discomfort.  However the areas very sensitive to touch.  She also notes some discomfort with extremes of varus/valgus stress.  Concerned about duration of symptoms.  Would like to see an orthopedic specialist before closing claim.    7/17/2025: Patient states that her symptoms are improving a little bit.  She states the area at the anterior knee is less sensitive than it was before.  She states with walking she really does not have any pain.  She states it is just very tender to touch at the anterior knee still.  She states that her orthopedic referral was approved through an orthopedic clinic however she is tried multiple times to schedule has not been able to schedule with them.  She continues to work full duty without difficulty.    PMH:   Reviewed, see above  MEDS:  Medications were reviewed in EMR  ALLERGIES:  Allergies were reviewed in EMR  SOCHX:  Works as a RN at   [employer]   FH:   No pertinent family history to this problem     Objective:   There were no vitals taken for this visit.    Constitutional: Patient is in no acute distress. Appears well-developed and well-nourished.   Cardiovascular: Normal rate.    Pulmonary/Chest: Effort normal. No respiratory distress.   Neurological: Patient is alert and oriented to person, place, and time.   Skin: Skin is warm and dry.   Psychiatric: Normal mood and affect. Behavior is normal.     MRI Left Knee: Intact ligaments and menisci. Tricompartment osteoarthritis which involves the patellofemoral and lateral femorotibial articulations to the greatest degree. Small joint effusion. Multiloculated synovial cyst tracking along the popliteus muscle.  Left knee: No gross deformity. Area of pain over the right knee.  Full range of motion.  Normal gait.       Assessment/Plan:     1. Contusion of left knee, subsequent encounter      Released to  Full Duty   Has noted gradual improvement in symptoms over the last month.  Referral to orthopedics, was approved for the [facility] however she is having difficult time getting an appointment, we will resend referral to another clinic  Diclofenac gel as needed  Heat/ice as needed  Follow-up here 6 weeks if not seen by orthopedics    Differential diagnosis, natural history, supportive care, and indications for immediate follow-up discussed.      Shiv Christensen D.O.       - - -

## 2025-07-25 NOTE — ED ADULT NURSE NOTE - CAS EDN INTEG ASSESS
The skin of the left side of the chest was prepped and draped in the usual sterile manner but not clipped. (If not otherwise specified, skin prep was bilateral.) WDL